# Patient Record
Sex: MALE | NOT HISPANIC OR LATINO | Employment: STUDENT
[De-identification: names, ages, dates, MRNs, and addresses within clinical notes are randomized per-mention and may not be internally consistent; named-entity substitution may affect disease eponyms.]

---

## 2021-04-21 ENCOUNTER — TRANSCRIBE ORDERS (OUTPATIENT)
Dept: OTHER | Age: 31
End: 2021-04-21

## 2021-04-21 ENCOUNTER — TRANSFERRED RECORDS (OUTPATIENT)
Dept: HEALTH INFORMATION MANAGEMENT | Facility: CLINIC | Age: 31
End: 2021-04-21

## 2021-04-21 ENCOUNTER — MEDICAL CORRESPONDENCE (OUTPATIENT)
Dept: HEALTH INFORMATION MANAGEMENT | Facility: CLINIC | Age: 31
End: 2021-04-21

## 2021-04-21 DIAGNOSIS — D22.9 CONGENITAL BENIGN SKIN MOLE: Primary | ICD-10-CM

## 2021-04-27 NOTE — TELEPHONE ENCOUNTER
FUTURE VISIT INFORMATION      FUTURE VISIT INFORMATION:    Date: 5/26/21    Time: 9:00am    Location: [facility] [place]  REFERRAL INFORMATION:    Referring provider:  Lianet Arreaga    Referring providers clinic:  [facility]    Reason for visit/diagnosis  Congenital benign skin mole     RECORDS REQUESTED FROM:       Clinic name Comments Records Status Imaging Status   [facility] sánchez scanned into chart EPIC

## 2021-05-26 ENCOUNTER — TELEPHONE (OUTPATIENT)
Dept: DERMATOLOGY | Facility: CLINIC | Age: 31
End: 2021-05-26

## 2021-05-26 ENCOUNTER — OFFICE VISIT (OUTPATIENT)
Dept: PLASTIC SURGERY | Facility: CLINIC | Age: 31
End: 2021-05-26
Attending: PHYSICIAN ASSISTANT
Payer: COMMERCIAL

## 2021-05-26 ENCOUNTER — PRE VISIT (OUTPATIENT)
Dept: SURGERY | Facility: CLINIC | Age: 31
End: 2021-05-26

## 2021-05-26 VITALS
SYSTOLIC BLOOD PRESSURE: 137 MMHG | DIASTOLIC BLOOD PRESSURE: 78 MMHG | WEIGHT: 110.6 LBS | BODY MASS INDEX: 19.6 KG/M2 | OXYGEN SATURATION: 97 % | HEIGHT: 63 IN | HEART RATE: 121 BPM

## 2021-05-26 DIAGNOSIS — L98.9 CHANGING SKIN LESION: Primary | ICD-10-CM

## 2021-05-26 PROCEDURE — 99203 OFFICE O/P NEW LOW 30 MIN: CPT | Performed by: PLASTIC SURGERY

## 2021-05-26 ASSESSMENT — MIFFLIN-ST. JEOR: SCORE: 1351.68

## 2021-05-26 NOTE — TELEPHONE ENCOUNTER
Pt called, No answer. VM recording states that mailbox has not been set up yet. Will flag encounter for staff to attempt to contact pt at a later time. Next procedure date availability is 7/23/2021 at 3pm ...SYLVIE Zarate RN, MD [place], Colleen E, WILBER; Daphne Travis LPN Hi     Please add to clinic for a procedure. Face ,mole removal     Thanks     Manuela

## 2021-05-26 NOTE — PROGRESS NOTES
CONSULT NOTE    REFERRING PROVIDER:  Lianet Arreaga PA-C    PRESENTING COMPLAINT:  Consultation for a changing mole on the right nasofacial junction.    HISTORY OF PRESENTING COMPLAINT:  Mr. Rothman is 31 years old.  He has had a mole on the right nasofacial junction for about 20 years.  Recently, it started to grow in size and change in nature.  Here to have it removed.    PAST MEDICAL HISTORY:  Nil.    PAST SURGICAL HISTORY:  Nil.    MEDICATIONS:  Nil.    ALLERGIES:  Nil.    SOCIAL HISTORY:  Smokes about 2-3 cigarettes a day for the last 6 years.  Does not drink alcohol.  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    REVIEW OF SYSTEMS:  Denies chest pain, shortness of breath, MI, CVA, DVT and PE.    PHYSICAL EXAMINATION:  Vital signs are stable.  He is afebrile, in no obvious distress.  On examination of the right nasofacial area at the junction, he has an approximately 3 x 3 mm, rounded, flesh-colored mole.  The surrounding skin is normal.    ASSESSMENT AND PLAN:  Based upon the above findings, a diagnosis of a changing mole was made.  Given the fact that it is changing in nature and he is concerned, I think an excisional biopsy is warranted.  We will do this under local anesthesia in my [facility].  All risks, benefits and alternatives, including pain, infection, bleeding, scarring, permanent scar, hypertrophic scar, keloid scar, recurrence, requirement of further surgery depending on pathology, DVT, PE, MI, CVA and death, were explained.  He understood them all and wants to proceed.  We will schedule him as soon as possible.  Questions were answered.  He was happy with the visit.    Total time spent with chart review, the visit itself and post visit paperwork was 30 minutes.     cc:  Lianet Arreaga PA-C  [address] [address]

## 2021-05-26 NOTE — NURSING NOTE
"Chief Complaint   Patient presents with     RECHECK     Skin mole on face.       Vitals:    05/26/21 0910   BP: 137/78   BP Location: Left arm   Patient Position: Sitting   Cuff Size: Adult Small   Pulse: 121   SpO2: 97%   Weight: 50.2 kg (110 lb 9.6 oz)   Height: 1.6 m (5' 2.99\")       Body mass index is 19.6 kg/m .                          Freda Norton, EMT    "

## 2021-06-02 NOTE — TELEPHONE ENCOUNTER
Pt called, No answer. Unable to leave a vm as recording states that the mailbox is full ..Malia Shepard RN

## 2021-06-07 ENCOUNTER — TELEPHONE (OUTPATIENT)
Dept: SURGERY | Facility: CLINIC | Age: 31
End: 2021-06-07

## 2021-06-07 NOTE — TELEPHONE ENCOUNTER
Patient left voicemail on surgery scheduling line stating that Dr. Portillo had let him know that a nurse would call after his visit.     He has not heard from anyone yet. Looks like RN has tried to contact him multiple times.    Unsure how he was routed to surgery scheduling at the [facility] patient.     Will route to clinic.

## 2021-06-08 NOTE — TELEPHONE ENCOUNTER
Pt called by RN  and notified that 's staff has been trying to contact the pt but there has been no ability to leave a vm as mailbox has not been set up so staff has been unable to leave a vm . Pt states understanding and apologized to staff stating that he did not know that his mailbox was full but will correct this. Pt scheduled for the procedure and will be added to the wait list if a sooner appt becomes available. Address and phone number of clinic provided. Pt verbalized understanding and thanked RN for the call...Malia Shepard RN

## 2021-08-13 ENCOUNTER — OFFICE VISIT (OUTPATIENT)
Dept: SURGERY | Facility: CLINIC | Age: 31
End: 2021-08-13
Payer: COMMERCIAL

## 2021-08-13 VITALS — TEMPERATURE: 99.3 F | DIASTOLIC BLOOD PRESSURE: 89 MMHG | HEART RATE: 128 BPM | SYSTOLIC BLOOD PRESSURE: 164 MMHG

## 2021-08-13 DIAGNOSIS — L98.9 CHANGING SKIN LESION: Primary | ICD-10-CM

## 2021-08-13 PROCEDURE — 11104 PUNCH BX SKIN SINGLE LESION: CPT | Performed by: PLASTIC SURGERY

## 2021-08-13 PROCEDURE — 13131 CMPLX RPR F/C/C/M/N/AX/G/H/F: CPT | Mod: 51 | Performed by: PLASTIC SURGERY

## 2021-08-13 PROCEDURE — 88305 TISSUE EXAM BY PATHOLOGIST: CPT | Performed by: PATHOLOGY

## 2021-08-13 NOTE — PROGRESS NOTES
PRESENTING COMPLAINT:  Followup visit for right nasofacial/cheek changing mole.    HISTORY OF PRESENTING COMPLAINT:  Mr. Rothman is 31 years old, here to have his mole removed.  All risks, benefits, and alternatives of the procedure were once again explained.  He understood them all and wants to proceed.    PROCEDURE NOTE:  After informed consent was taken from the patient, the proper of the procedure was ascertained with him and he was appropriately marked.  He was taken the procedure room.  His right nasofacial angle right at the nasal ala to cheek junction where the 7 mm x 7 mm mole was present was  prepped and draped in standard surgical fashion.  I went ahead and injected 1% lidocaine with 1:100,000 epinephrine in the local area.  I then went ahead and carried out a bunch excisional biopsy with an 8 mm punch down to the subcutaneous plane, sent the specimen off to Pathology in formalin.  I then ensured hemostasis.  Now, I needed to close this, but the indentation had been destroyed.  I therefore cut out some of the fatty tissue in that area with the cautery.  I then used 5-0 Monocryl suture and sutured down the cheek skin into the deep recesses of that area, thus recreating the nasofacial groove and then closed the skin in a simple fashion using 5-0 Prolene suture in interrupted fashion with a dressing was placed on top.  He tolerated the procedure well.    FOLLOWUP:  We will see him back in 2 weeks to remove the sutures.    Total time spent with chart review, visit itself and post-visit paperwork and procedure was 30 minutes.

## 2021-08-13 NOTE — LETTER
8/13/2021         RE: Dilia Rothman  [address] [address]        Dear Colleague,    Thank you for referring your patient, Dilia Rothman, to the [facility]. Please see a copy of my visit note below.    PRESENTING COMPLAINT:  Followup visit for right nasofacial/cheek changing mole.    HISTORY OF PRESENTING COMPLAINT:  Mr. Rothman is 31 years old, here to have his mole removed.  All risks, benefits, and alternatives of the procedure were once again explained.  He understood them all and wants to proceed.    PROCEDURE NOTE:  After informed consent was taken from the patient, the proper of the procedure was ascertained with him and he was appropriately marked.  He was taken the procedure room.  His right nasofacial angle right at the nasal ala to cheek junction where the 7 mm x 7 mm mole was present was  prepped and draped in standard surgical fashion.  I went ahead and injected 1% lidocaine with 1:100,000 epinephrine in the local area.  I then went ahead and carried out a bunch excisional biopsy with an 8 mm punch down to the subcutaneous plane, sent the specimen off to Pathology in formalin.  I then ensured hemostasis.  Now, I needed to close this, but the indentation had been destroyed.  I therefore cut out some of the fatty tissue in that area with the cautery.  I then used 5-0 Monocryl suture and sutured down the cheek skin into the deep recesses of that area, thus recreating the nasofacial groove and then closed the skin in a simple fashion using 5-0 Prolene suture in interrupted fashion with a dressing was placed on top.  He tolerated the procedure well.    FOLLOWUP:  We will see him back in 2 weeks to remove the sutures.    Total time spent with chart review, visit itself and post-visit paperwork and procedure was 30 minutes.          Again, thank you for allowing me to participate in the care of your patient.        Sincerely,        SYLVIE Portillo MD

## 2021-08-13 NOTE — PATIENT INSTRUCTIONS
Excision Wound Care Instructions  I will experience scar, altered skin color, bleeding, swelling, pain, crusting and redness. I may experience altered sensation. Risks are excessive bleeding, infection, muscle weakness, thick (hypertrophic or keloidal) scar, and recurrence,. A second procedure may be recommended to obtain the best cosmetic or functional result.  After your surgery, Dermabond may be placed over the area that has sutures and a dry dressing. Please follow these instructions, as they will help you to prevent complications as your wound heals.  For the First 48 hours After Surgery:  1. If  pressure bandage used keep it on and keep it dry. If it should come loose, you may retape it, but do not take it off.  2. Relax and take it easy. Do not do any vigorous exercise, heavy lifting, or bending forward. This could cause the wound to bleed.  3. Post-operative pain is usually mild. You may take plain or extra strength Tylenol every 4 hours as needed (do not take more than 4,000mg in one day). Do not take any medicine that contains aspirin, ibuprofen or motrin unless you have been recommended these by a doctor.  Avoid alcohol and vitamin E as these may increase your tendency to bleed.  4. You may put an ice pack around the bandaged area for 20 minutes every 2-3 hours. This may help reduce swelling, bruising, and pain. Make sure the ice pack is waterproof so that the pressure bandage does not get wet.   48 Hours After Surgery  If dressing is present carefully remove. You may also now shower and get the wound wet. Wash wound with a mild soap and water.  Use caution when washing the wound. Be gentle and do not let the forceful shower stream hit the wound directly.  PAT dry.  Daily Wound Care:  1. Wash wound with a mild soap and water.  Use caution when washing the wound, be gentle and do not let the forceful shower stream hit the wound directly.  2. PAT DRY.  3. After one week start moisturizing the site with  Vaseline or Aquafore  4. No tub soaking, bathing or swimming while sutures are in place or until after follow up appt.      Call Us If:  1. You have pain that is not controlled with Tylenol.  2. You have signs or symptoms of an infection, such as: fever over 100 degrees F, redness, warmth, or foul-smelling or yellow/creamy drainage from the wound.  Who should I call with questions?    [facility]: [phone]     [facility]: [phone]    For urgent needs outside of business hours call the [facility] at [phone] and ask for the dermatology resident on call

## 2021-08-13 NOTE — NURSING NOTE
Dilia Rothman's goals for this visit include: .  Chief Complaint   Patient presents with     Procedure     excision to right inner cheek/nose        He requests these members of his care team be copied on today's visit information: no    PCP: Lianet Arreaga    Referring Provider:  No referring provider defined for this encounter.    BP (!) 164/89 (BP Location: Left arm, Patient Position: Chair)   Pulse (!) 128   Temp 99.3  F (37.4  C)     Do you need any medication refills at today's visit? No...Malia Shepard RN

## 2021-08-13 NOTE — NURSING NOTE
The following medication was given:     MEDICATION:  Lidocaine with epinephrine 1% 1:[id number]  ROUTE: [place]  SITE: see procedure note  DOSE: see procedure note  LOT #: -EV  : Hospira  EXPIRATION DATE: 2/1/2022  NDC#: 5312-3922-69     Was there drug waste? no  Multi-dose vial: Yes    Daphne Travis LPN  August 13, 2021    Daphne Travis LPN

## 2021-08-17 LAB
PATH REPORT.COMMENTS IMP SPEC: NORMAL
PATH REPORT.COMMENTS IMP SPEC: NORMAL
PATH REPORT.FINAL DX SPEC: NORMAL
PATH REPORT.GROSS SPEC: NORMAL
PATH REPORT.MICROSCOPIC SPEC OTHER STN: NORMAL
PATH REPORT.RELEVANT HX SPEC: NORMAL

## 2021-08-18 ENCOUNTER — TELEPHONE (OUTPATIENT)
Dept: SURGERY | Facility: CLINIC | Age: 31
End: 2021-08-18

## 2021-08-18 NOTE — TELEPHONE ENCOUNTER
Pt called, No answer.  does not identify pt. Left general message for pt to call the [facility] back at [phone].    Daphne Travis LPN

## 2021-08-18 NOTE — TELEPHONE ENCOUNTER
----- Message from SYLVIE Portillo MD sent at 8/18/2021  8:06 AM CDT -----  Regarding: Path  Hi    Path benign    Thanks    Manuela

## 2021-08-18 NOTE — LETTER
Ma  [address] [address]        August 25, 2021    Dear ,    We attempted to contact you by phone but have been unable to reach you. Voice messages were left. We are writing to inform you of your test results.     wanted to let you know that you pathology results came back benign.     If you have any questions or concerns please let us know by calling [phone]      Have a wonderful day        Malia Shepard RN  General surgery, Plastics and Dermatology

## 2021-08-20 NOTE — TELEPHONE ENCOUNTER
Pt called, No answer.  does not identify pt. Left general message for pt to call the [facility] clinic back at [phone]....Malia Shepard RN

## 2021-08-25 NOTE — TELEPHONE ENCOUNTER
Pt called, No answer.  does not identify pt. Left general message for pt to call the [facility] back at [phone]. Letter sent. Appt notes updated to inform pt of results when he is in clinic this Friday 8/27/21..Malia Shepard RN

## 2021-08-27 ENCOUNTER — OFFICE VISIT (OUTPATIENT)
Dept: SURGERY | Facility: CLINIC | Age: 31
End: 2021-08-27
Payer: COMMERCIAL

## 2021-08-27 DIAGNOSIS — L98.9 CHANGING SKIN LESION: Primary | ICD-10-CM

## 2021-08-27 PROCEDURE — 99212 OFFICE O/P EST SF 10 MIN: CPT | Performed by: PLASTIC SURGERY

## 2021-08-27 NOTE — NURSING NOTE
Dilia Rothman's goals for this visit include:   Chief Complaint   Patient presents with     Suture Removal     suture removal       He requests these members of his care team be copied on today's visit information:     PCP: Lianet Arreaga    Referring Provider:  No referring provider defined for this encounter.    There were no vitals taken for this visit.    Do you need any medication refills at today's visit? Daina Sandra on 8/27/2021 at 3:44 PM

## 2021-08-27 NOTE — PROGRESS NOTES
FOLLOWUP VISIT NOTE    PRESENTING COMPLAINT:  Followup visit, status post right nasal/facial/cheek changing mole excision done 08/13/2021.    HISTORY OF PRESENTING COMPLAINT:  Mr. Rothman is 31 years old, a couple of weeks out from surgery.  He has done well with no major issues.  His pathology showed it to be an intradermal melanocytic nevus.    PHYSICAL EXAMINATION:  Vital signs are stable.  He is afebrile, in no obvious distress.  Everything is healing in well.    ASSESSMENT AND PLAN:  Based upon the above findings, a diagnosis of a right cheek mole removal was made.  Sutures were removed.  I advised aggressive moisturization and sun protection.  I will see him back p.r.n.

## 2021-10-17 ENCOUNTER — HEALTH MAINTENANCE LETTER (OUTPATIENT)
Age: 31
End: 2021-10-17

## 2022-10-03 ENCOUNTER — HEALTH MAINTENANCE LETTER (OUTPATIENT)
Age: 32
End: 2022-10-03

## 2023-02-11 ENCOUNTER — HEALTH MAINTENANCE LETTER (OUTPATIENT)
Age: 33
End: 2023-02-11

## 2024-03-09 ENCOUNTER — HEALTH MAINTENANCE LETTER (OUTPATIENT)
Age: 34
End: 2024-03-09